# Patient Record
Sex: MALE | Race: WHITE | NOT HISPANIC OR LATINO | Employment: FULL TIME | URBAN - METROPOLITAN AREA
[De-identification: names, ages, dates, MRNs, and addresses within clinical notes are randomized per-mention and may not be internally consistent; named-entity substitution may affect disease eponyms.]

---

## 2019-06-09 ENCOUNTER — APPOINTMENT (OUTPATIENT)
Dept: GENERAL RADIOLOGY | Facility: CLINIC | Age: 34
End: 2019-06-09
Attending: PHYSICIAN ASSISTANT
Payer: COMMERCIAL

## 2019-06-09 ENCOUNTER — HOSPITAL ENCOUNTER (EMERGENCY)
Facility: CLINIC | Age: 34
Discharge: HOME | End: 2019-06-09
Attending: PHYSICIAN ASSISTANT | Admitting: PHYSICIAN ASSISTANT
Payer: COMMERCIAL

## 2019-06-09 VITALS
DIASTOLIC BLOOD PRESSURE: 94 MMHG | HEART RATE: 64 BPM | SYSTOLIC BLOOD PRESSURE: 137 MMHG | RESPIRATION RATE: 20 BRPM | TEMPERATURE: 98.3 F | OXYGEN SATURATION: 99 % | BODY MASS INDEX: 27.05 KG/M2 | WEIGHT: 205 LBS

## 2019-06-09 DIAGNOSIS — S52.502A CLOSED FRACTURE OF DISTAL END OF LEFT RADIUS, UNSPECIFIED FRACTURE MORPHOLOGY, INITIAL ENCOUNTER: ICD-10-CM

## 2019-06-09 PROCEDURE — 73090 X-RAY EXAM OF FOREARM: CPT | Mod: LT

## 2019-06-09 PROCEDURE — 73110 X-RAY EXAM OF WRIST: CPT | Mod: LT

## 2019-06-09 PROCEDURE — G0463 HOSPITAL OUTPT CLINIC VISIT: HCPCS | Performed by: PHYSICIAN ASSISTANT

## 2019-06-09 PROCEDURE — 25600 CLTX DST RDL FX/EPHYS SEP WO: CPT | Mod: LT | Performed by: PHYSICIAN ASSISTANT

## 2019-06-09 PROCEDURE — 99213 OFFICE O/P EST LOW 20 MIN: CPT | Mod: 25 | Performed by: PHYSICIAN ASSISTANT

## 2019-06-09 ASSESSMENT — ENCOUNTER SYMPTOMS: CONSTITUTIONAL NEGATIVE: 1

## 2019-06-09 NOTE — ED AVS SNAPSHOT
[facility] Emergency Department  [address] [address]  Phone:  [phone]  Fax:  [phone]                                    Maged Hugo   MRN: [id number]    Department:  [facility] Emergency Department   Date of Visit:  6/9/2019           After Visit Summary Signature Page    I have received my discharge instructions, and my questions have been answered. I have discussed any challenges I see with this plan with the nurse or doctor.    ..........................................................................................................................................  Patient/Patient Representative Signature      ..........................................................................................................................................  Patient Representative Print Name and Relationship to Patient    ..................................................               ................................................  Date                                   Time    ..........................................................................................................................................  Reviewed by Signature/Title    ...................................................              ..............................................  Date                                               Time          22EPIC Rev 08/18

## 2019-06-09 NOTE — ED PROVIDER NOTES
History     Chief Complaint   Patient presents with     Wrist Pain     left wrist injury last night     HPI  Maged Hugo is a 33 year old male who presents with complaints of left wrist pain and swelling since he fell last night.  Patient states he was playing baseball when he slid to catch a ball and landed on his right wrist.  He developed immediate pain and swelling across his wrist at that time.  Patient states he has history of ORIF of a past fracture in this left wrist about 15 years ago and still has hardware in this wrist.  He describes increased pain with movement of this wrist.  Patient is right-hand dominant.      Allergies:  No Known Allergies    Problem List:    There are no active problems to display for this patient.       Past Medical History:    No past medical history on file.    Past Surgical History:    No past surgical history on file.    Family History:    No family history on file.    Social History:  Marital Status:  Single [1]  Social History     Tobacco Use     Smoking status: Never Smoker     Smokeless tobacco: Never Used   Substance Use Topics     Alcohol use: Not on file     Drug use: Not on file        Medications:      NO ACTIVE MEDICATIONS         Review of Systems   Constitutional: Negative.    Musculoskeletal:        Left wrist pain and swelling   Skin: Negative.    All other systems reviewed and are negative.      Physical Exam   BP: (!) 137/94  Pulse: 64  Temp: 98.3  F (36.8  C)  Resp: 20  Weight: 93 kg (205 lb)  SpO2: 99 %      Physical Exam   Constitutional: He appears well-developed and well-nourished. No distress.   HENT:   Head: Normocephalic and atraumatic.   Eyes: Conjunctivae are normal.   Neck: Neck supple.   Cardiovascular: Intact distal pulses.   Pulmonary/Chest: Effort normal.   Musculoskeletal:        Left elbow: Normal.        Left wrist: He exhibits decreased range of motion, bony tenderness and swelling. He exhibits no crepitus.        Left hand: He exhibits  tenderness and swelling. He exhibits normal range of motion, no bony tenderness and normal capillary refill. Normal sensation noted. Normal strength noted.   Swelling and tenderness across left wrist   Neurological: He is alert. He has normal strength. No sensory deficit.   Skin: Skin is warm and dry.       ED Course        Splint application  Date/Time: 6/9/2019 3:31 PM  Performed by: Shantelle Treadwell PA-C  Authorized by: Shantelle Treadwell PA-C   Consent: Verbal consent obtained.  Risks and benefits: risks, benefits and alternatives were discussed  Consent given by: patient  Patient understanding: patient states understanding of the procedure being performed  Patient identity confirmed: verbally with patient  Location details: left wrist  Splint type: sugar tong  Supplies used: Ortho-Glass  Post-procedure: The splinted body part was neurovascularly unchanged following the procedure.  Patient tolerance: Patient tolerated the procedure well with no immediate complications          Results for orders placed or performed during the hospital encounter of 06/09/19 (from the past 24 hour(s))   Radius/Ulna XR,  PA &LAT, left    Narrative    LEFT FOREARM TWO VIEWS      6/9/2019 3:00 PM     HISTORY: Fall onto wrist, tenderness and swelling across wrist,  history of ORIF.    COMPARISON: None.      Impression    IMPRESSION: Distal radius fracture is better demonstrated on wrist  x-rays. No other fractures are identified. Soft tissues are  unremarkable.     CHELLY HUNT MD   XR Wrist Left G/E 3 Views    Narrative    LEFT WRIST THREE OR MORE VIEWS    6/9/2019 3:00 PM     HISTORY: Fall onto wrist, tenderness and swelling across wrist,  history of ORIF.    COMPARISON: None.    FINDINGS: An acute intra-articular distal radius fracture is present  involving the radial styloid process. Minimal displacement is present.  A screw is present within the scaphoid bone across old fracture. Mild  radiocarpal degenerative change is  present.      Impression    IMPRESSION: Acute minimally displaced fracture of the distal radius.     CHELLY HUNT MD       Medications - No data to display    Assessments & Plan (with Medical Decision Making)     Pt is a 33 year old male who presents with complaints of left wrist pain and swelling since he fell last night.  Patient states he was playing baseball when he slid to catch a ball and landed on his right wrist.  He developed immediate pain and swelling across his wrist at that time.  Patient states he has history of ORIF of a past fracture in this left wrist about 15 years ago and still has hardware in this wrist.  He describes increased pain with movement of this wrist.  Patient is right-hand dominant.  Pt is afebrile on arrival.  Exam as above.  X-rays of left wrist were positive for an acute minimally displaced distal radius fracture.  Discussed results with patient.  Pt's wrist was splinted (see above procedure note).  Return precautions were reviewed.  Hand-outs were provided.    Patient was instructed to follow-up with orthopedics in 3-5 days for continued care and management (referral was made) or sooner if new or worsening symptoms.  He is to return to the ED for persistent and/or worsening symptoms.  Patient expressed understanding of the diagnosis and plan and was discharged home in good condition.    I have reviewed the nursing notes.    I have reviewed the findings, diagnosis, plan and need for follow up with the patient.       Medication List      There are no discharge medications for this visit.         Final diagnoses:   Closed fracture of distal end of left radius, unspecified fracture morphology, initial encounter       6/9/2019   [facility] EMERGENCY DEPARTMENT      Disclaimer:  This note consists of symbols derived from keyboarding, dictation and/or voice recognition software.  As a result, there may be errors in the script that have gone undetected.  Please consider this when  interpreting information found in this chart.     Shantelle Treadwell PA-C  06/09/19 [id number]

## 2019-06-10 ENCOUNTER — OFFICE VISIT (OUTPATIENT)
Dept: ORTHOPEDICS | Facility: CLINIC | Age: 34
End: 2019-06-10
Payer: COMMERCIAL

## 2019-06-10 VITALS
WEIGHT: 231.8 LBS | HEIGHT: 73 IN | DIASTOLIC BLOOD PRESSURE: 74 MMHG | SYSTOLIC BLOOD PRESSURE: 126 MMHG | RESPIRATION RATE: 16 BRPM | BODY MASS INDEX: 30.72 KG/M2 | HEART RATE: 64 BPM

## 2019-06-10 DIAGNOSIS — S52.514A NONDISPLACED FRACTURE OF RIGHT RADIAL STYLOID PROCESS, INITIAL ENCOUNTER FOR CLOSED FRACTURE: Primary | ICD-10-CM

## 2019-06-10 PROCEDURE — 99203 OFFICE O/P NEW LOW 30 MIN: CPT | Mod: 57 | Performed by: ORTHOPAEDIC SURGERY

## 2019-06-10 PROCEDURE — 25600 CLTX DST RDL FX/EPHYS SEP WO: CPT | Mod: 55 | Performed by: ORTHOPAEDIC SURGERY

## 2019-06-10 RX ORDER — ACETAMINOPHEN 500 MG
500-1000 TABLET ORAL EVERY 6 HOURS PRN
COMMUNITY
End: 2020-08-24

## 2019-06-10 ASSESSMENT — PAIN SCALES - GENERAL: PAINLEVEL: MILD PAIN (2)

## 2019-06-10 ASSESSMENT — MIFFLIN-ST. JEOR: SCORE: 2040.32

## 2019-06-10 NOTE — PROGRESS NOTES
Patient was fitted with a left thumb spica brace. All questions were answered to patient's satisfaction. DME form explained, signed, and copy given to the patient for their records.   Keara Ames Clinical Medical Assistant

## 2019-06-10 NOTE — PROGRESS NOTES
Chief Complaint:   Chief Complaint   Patient presents with     Left Wrist - RECHECK     Distal radius fracture. DOI 6/9/19, 1 day s/p. Patient states he was playing softball and he caught a ball and rolled over his arm. Immediate pain and swelling. He was seen at [place] and placed in a splint, has stayed in splint.      Wrist Pain     Hx of surgery on the left wrist, hardware was placed ~ 2004. He can feel pain in his hand and up into his forearm.         HPI: Maged Hugo is a 33 year old male , right -hand dominant, who presents for evaluation and management of a left wrist injury. He injured his hand on 6/8/2019 while playing softball, slid to catch a ball and landed on his left wrist. Onset of pain, swelling. Continued to finish the game and then play even another game afterwards. Hand started to feel tight from the swelling. Presented to the emergency room yesterday, the next day after injury with xrays showing fracture. Splinted. It has been 2 days since the initial injury. Pain radiates up the forearm to the elbow.    Had previous open-reduction, internal fixation of left scaphoid fracture with screw ~2004. Does have aching pain in wrist with weather changes. Also in his ankle from prior injury/surgery as well.      He reports having mild pain/discomfort around the injury site. He denies associated numbness or tingling. He denies any other injuries to his upper extremity.   Symptoms: pain, swelling.  Location of symptoms: left wrist.  Pain severity: 2/10  Pain quality: dull and aching  Frequency of symptoms: are constant  Aggravating factors: movement.  Relieving factors: rest, splint.    Previous treatment: splint.    Past medical history:    There are no active problems to display for this patient.    Past surgical history:  has no past surgical history on file.     Medications:    Current Outpatient Medications   Medication Sig Dispense Refill     NO ACTIVE MEDICATIONS           Allergies:   No Known  "Allergies     Family History: family history is not on file.     Social History: ;  reports that he has never smoked. He has never used smokeless tobacco.    Review of Systems:  ROS: 10 point ROS neg other than the symptoms noted above in the HPI and past medical history.    Physical Exam  GENERAL APPEARANCE: healthy, alert, no distress. Accompanied by his wife, 2 small children.  SKIN: no suspicious lesions or rashes  NEURO: Normal strength and tone, mentation intact and speech normal  PSYCH:  mentation appears normal and affect normal. Not anxious.  RESPIRATORY: No increased work of breathing.    /74   Pulse 64   Resp 16   Ht 1.854 m (6' 1\")   Wt 105.1 kg (231 lb 12.8 oz)   BMI 30.58 kg/m       left WRIST/HAND EXAM:    The splint was removed    Skin intact. No abnormal skin discoloration, erythema or ecchymosis.   Normal wear pattern, color and tone.    Curvilinear scar dorsoradial wrist from prior scaphoid open-reduction, internal fixation.    There is moderate swelling in the wrist, hand, fingers of the right upper extremity .  There is moderate tenderness in the wrist, notably the radial styloid  Nontender to palpation ulnarly, forearm, hand.  There is no ecchymosis.  There is no erythema of the surrounding skin.  There is no maceration of the skin.  There is swelling deformity in the area.    Intact sensation light touch median, radial, ulnar nerves of the hand  Intact sensation to the radial and ulnar digital nerves of the fingers, as well as the finger tips.  Intact epl fpl fdp edc wrist flexion/extension biceps/triceps deltoid  Brisk capillary refill to all fingers.   Palpable radial pulse, 2+.    X-rays:  3 views left wrist, 2 views left forearm from 6/9/2019 were reviewed in clinic today. On my review, intra-articular distal radius fracture is present involving the radial styloid process. Minimal displacement is present. A screw is present within the scaphoid bone across old " fracture. Mild radiocarpal degenerative change is present.      Assessment: 32yo RHD male with minimally displaced, intra-articular radial styloid fracture right distal radius.    Plan:  * reviewed xrays with patient. Based on the xrays, the fracture is minimally/mildly displaced and in acceptable alignment. As long as fracture maintains alignment, nonoperative treatment can be pursued. However, if fracture displaces, then the fracture is unstable, and would recommend open-reduction and internal fixation. I did discuss that we would need to monitor this fracture on a weekly basis for the next couple of weeks, such that if fracture does displace, we can catch it sooner rather than later, and proceed with treatment at that time. Patient does understand.     * immobilize in thumb spica brace today, on essentially 24/7.  * elevation, finger range of motion  * over the counter pain control  * non weight bearing. Finger range of motion  * reassess 2 weeks, repeat xrays right wrist.      James Kennedy M.D., M.S.  Dept. of Orthopaedic Surgery  [facility]

## 2019-06-10 NOTE — LETTER
6/10/2019         RE: Maged Hugo  [id number] [facility] [address]        Dear Colleague,    Thank you for referring your patient, Maged Hugo, to the [facility] [place]. Please see a copy of my visit note below.    Chief Complaint:   Chief Complaint   Patient presents with     Left Wrist - RECHECK     Distal radius fracture. DOI 6/9/19, 1 day s/p. Patient states he was playing softball and he caught a ball and rolled over his arm. Immediate pain and swelling. He was seen at [place] and placed in a splint, has stayed in splint.      Wrist Pain     Hx of surgery on the left wrist, hardware was placed ~ 2004. He can feel pain in his hand and up into his forearm.         HPI: Maged Hugo is a 33 year old male , right -hand dominant, who presents for evaluation and management of a left wrist injury. He injured his hand on 6/8/2019 while playing softball, slid to catch a ball and landed on his left wrist. Onset of pain, swelling. Continued to finish the game and then play even another game afterwards. Hand started to feel tight from the swelling. Presented to the emergency room yesterday, the next day after injury with xrays showing fracture. Splinted. It has been 2 days since the initial injury. Pain radiates up the forearm to the elbow.    Had previous open-reduction, internal fixation of left scaphoid fracture with screw ~2004. Does have aching pain in wrist with weather changes. Also in his ankle from prior injury/surgery as well.      He reports having mild pain/discomfort around the injury site. He denies associated numbness or tingling. He denies any other injuries to his upper extremity.   Symptoms: pain, swelling.  Location of symptoms: left wrist.  Pain severity: 2/10  Pain quality: dull and aching  Frequency of symptoms: are constant  Aggravating factors: movement.  Relieving factors: rest, splint.    Previous treatment: splint.    Past medical history:    There are  "no active problems to display for this patient.    Past surgical history:  has no past surgical history on file.     Medications:    Current Outpatient Medications   Medication Sig Dispense Refill     NO ACTIVE MEDICATIONS           Allergies:   No Known Allergies     Family History: family history is not on file.     Social History: ;  reports that he has never smoked. He has never used smokeless tobacco.    Review of Systems:  ROS: 10 point ROS neg other than the symptoms noted above in the HPI and past medical history.    Physical Exam  GENERAL APPEARANCE: healthy, alert, no distress. Accompanied by his wife, 2 small children.  SKIN: no suspicious lesions or rashes  NEURO: Normal strength and tone, mentation intact and speech normal  PSYCH:  mentation appears normal and affect normal. Not anxious.  RESPIRATORY: No increased work of breathing.    /74   Pulse 64   Resp 16   Ht 1.854 m (6' 1\")   Wt 105.1 kg (231 lb 12.8 oz)   BMI 30.58 kg/m        left WRIST/HAND EXAM:    The splint was removed    Skin intact. No abnormal skin discoloration, erythema or ecchymosis.   Normal wear pattern, color and tone.    Curvilinear scar dorsoradial wrist from prior scaphoid open-reduction, internal fixation.    There is moderate swelling in the wrist, hand, fingers of the right upper extremity .  There is moderate tenderness in the wrist, notably the radial styloid  Nontender to palpation ulnarly, forearm, hand.  There is no ecchymosis.  There is no erythema of the surrounding skin.  There is no maceration of the skin.  There is swelling deformity in the area.    Intact sensation light touch median, radial, ulnar nerves of the hand  Intact sensation to the radial and ulnar digital nerves of the fingers, as well as the finger tips.  Intact epl fpl fdp edc wrist flexion/extension biceps/triceps deltoid  Brisk capillary refill to all fingers.   Palpable radial pulse, 2+.    X-rays:  3 views left wrist, " 2 views left forearm from 6/9/2019 were reviewed in clinic today. On my review, intra-articular distal radius fracture is present involving the radial styloid process. Minimal displacement is present. A screw is present within the scaphoid bone across old fracture. Mild radiocarpal degenerative change is present.      Assessment: 32yo RHD male with minimally displaced, intra-articular radial styloid fracture right distal radius.    Plan:  * reviewed xrays with patient. Based on the xrays, the fracture is minimally/mildly displaced and in acceptable alignment. As long as fracture maintains alignment, nonoperative treatment can be pursued. However, if fracture displaces, then the fracture is unstable, and would recommend open-reduction and internal fixation. I did discuss that we would need to monitor this fracture on a weekly basis for the next couple of weeks, such that if fracture does displace, we can catch it sooner rather than later, and proceed with treatment at that time. Patient does understand.     * immobilize in thumb spica brace today, on essentially 24/7.  * elevation, finger range of motion  * over the counter pain control  * non weight bearing. Finger range of motion  * reassess 2 weeks, repeat xrays right wrist.      James Kennedy M.D., M.S.  Dept. of Orthopaedic Surgery  [facility]      Patient was fitted with a left thumb spica brace. All questions were answered to patient's satisfaction. DME form explained, signed, and copy given to the patient for their records.   Keara Ames Clinical Medical Assistant        Again, thank you for allowing me to participate in the care of your patient.        Sincerely,        James Kennedy MD

## 2019-06-17 ENCOUNTER — TELEPHONE (OUTPATIENT)
Dept: ORTHOPEDICS | Facility: CLINIC | Age: 34
End: 2019-06-17

## 2019-06-17 NOTE — TELEPHONE ENCOUNTER
Patient would like a call back, as they believe they should be scheduling a follow up x-ray, but there are no orders.  Should they schedule a follow up with Dr. Kennedy or should x-ray orders be placed and they schedule for another x-ray?

## 2019-06-17 NOTE — TELEPHONE ENCOUNTER
Called patient and let him know he should make an appointment with Dr. Kennedy and they will get the xray at that time.    Rachana Cast MA

## 2019-06-24 ENCOUNTER — ANCILLARY PROCEDURE (OUTPATIENT)
Dept: GENERAL RADIOLOGY | Facility: CLINIC | Age: 34
End: 2019-06-24
Attending: ORTHOPAEDIC SURGERY
Payer: COMMERCIAL

## 2019-06-24 ENCOUNTER — OFFICE VISIT (OUTPATIENT)
Dept: ORTHOPEDICS | Facility: CLINIC | Age: 34
End: 2019-06-24
Payer: COMMERCIAL

## 2019-06-24 VITALS
HEART RATE: 89 BPM | HEIGHT: 73 IN | DIASTOLIC BLOOD PRESSURE: 72 MMHG | SYSTOLIC BLOOD PRESSURE: 131 MMHG | WEIGHT: 229.2 LBS | RESPIRATION RATE: 16 BRPM | BODY MASS INDEX: 30.38 KG/M2

## 2019-06-24 DIAGNOSIS — S52.514A NONDISPLACED FRACTURE OF RIGHT RADIAL STYLOID PROCESS, INITIAL ENCOUNTER FOR CLOSED FRACTURE: ICD-10-CM

## 2019-06-24 DIAGNOSIS — S52.515D NONDISPLACED FRACTURE OF LEFT RADIAL STYLOID PROCESS, SUBSEQUENT ENCOUNTER FOR CLOSED FRACTURE WITH ROUTINE HEALING: Primary | ICD-10-CM

## 2019-06-24 PROCEDURE — 99207 ZZC FRACTURE CARE IN GLOBAL PERIOD: CPT | Performed by: ORTHOPAEDIC SURGERY

## 2019-06-24 PROCEDURE — 73110 X-RAY EXAM OF WRIST: CPT | Mod: LT

## 2019-06-24 ASSESSMENT — MIFFLIN-ST. JEOR: SCORE: 2028.52

## 2019-06-24 ASSESSMENT — PAIN SCALES - GENERAL: PAINLEVEL: NO PAIN (0)

## 2019-06-24 NOTE — LETTER
6/24/2019         RE: Maged Hugo  [id number] [facility] [address]        Dear Colleague,    Thank you for referring your patient, Maged Hugo, to the [facility] [place]. Please see a copy of my visit note below.    Chief Complaint:   Chief Complaint   Patient presents with     Left Wrist - RECHECK     Distal radius fracture. DOI 6/9/19, 2 wk s/p. Patient notes his wrist is doing good. He continues to wear the brace. Denies any pain. Able to move his fingers without issue. Has a little stiffness in hand.       Nondisplaced left wrist radial styloid fracture  6/8/2019      HPI: Maged Hugo is a 33 year old male , right -hand dominant, who presents for followup evaluation and management of a left wrist injury. He injured his hand on 6/8/2019 while playing softball, slid to catch a ball and landed on his left wrist. Onset of pain, swelling. Continued to finish the game and then play even another game afterwards. Hand started to feel tight from the swelling. Presented to the emergency room  the next day after injury with xrays showing fracture. Splinted. It has been 2 weeks since the initial injury. Has been wearing thumb spica brace. Denies pain, hand/finger stiffness from brace. He's been taking it easy. Pain 0/10.    Had previous open-reduction, internal fixation of left scaphoid fracture with screw ~2004. Does have aching pain in wrist with weather changes. Also in his ankle from prior injury/surgery as well.      He reports having no pain/discomfort around the injury site. He denies associated numbness or tingling. He denies any other injuries to his upper extremity.   Symptoms: stiffness.  Location of symptoms: left wrist/hand.  Pain severity: 0/10  Pain quality: dull and stiff  Frequency of symptoms: are occasional  Aggravating factors: movement.  Relieving factors: rest, splint.    Previous treatment: splint.    Past medical history:    There are no active problems to  "display for this patient.    Past surgical history:  has no past surgical history on file.     Medications:    Current Outpatient Medications   Medication Sig Dispense Refill     acetaminophen (TYLENOL) 500 MG tablet Take 500-1,000 mg by mouth every 6 hours as needed for mild pain       NO ACTIVE MEDICATIONS           Allergies:   No Known Allergies     Family History: family history is not on file.     Social History: ;  reports that he has never smoked. He has never used smokeless tobacco.    Review of Systems:     Denies numbness, tingling, parasthesias.   Denies headaches.   Denies fevers, chills, night sweats   Denies chest pain.   Denies shortness of breath.   Denies any skin problems, abrasions, rashes, irritation.    Physical Exam  GENERAL APPEARANCE: healthy, alert, no distress  SKIN: no suspicious lesions or rashes  NEURO: Normal strength and tone, mentation intact and speech normal  PSYCH:  mentation appears normal and affect normal. Not anxious.  RESPIRATORY: No increased work of breathing.    /72   Pulse 89   Resp 16   Ht 1.854 m (6' 1\")   Wt 104 kg (229 lb 3.2 oz)   BMI 30.24 kg/m        left WRIST/HAND EXAM:    The brace was removed    Skin intact. No abnormal skin discoloration, erythema or ecchymosis.   Normal wear pattern, color and tone.    Curvilinear scar dorsoradial wrist from prior scaphoid open-reduction, internal fixation.    There is no swelling in the wrist, hand, fingers of the right upper extremity .  There is mild tenderness in the wrist, notably the radial styloid  Nontender to palpation ulnarly, forearm, hand.  There is no ecchymosis.  There is no erythema of the surrounding skin.  There is no maceration of the skin.  There is swelling deformity in the area.    Intact sensation light touch median, radial, ulnar nerves of the hand  Intact sensation to the radial and ulnar digital nerves of the fingers, as well as the finger tips.  Intact epl fpl fdp edc " wrist flexion/extension biceps/triceps deltoid  Brisk capillary refill to all fingers.   Palpable radial pulse, 2+.    X-rays:  3 views left wrist 6/24/2019 were reviewed in clinic today. On my review, intra-articular distal radius fracture is present involving the radial styloid process. Minimal displacement is present. A screw is present within the scaphoid bone across old fracture. Mild radiocarpal degenerative change is present. Cystic changes distal radius as prior. No change in alignment from prior.      Assessment: 34yo RHD male with minimally displaced, intra-articular radial styloid fracture left distal radius.    Plan:  * reviewed xrays with patient. Based on the xrays, the fracture is minimally if at all displaced and in acceptable alignment. Alignment is stable to previous images 2 weeks ago. As long as fracture maintains alignment, nonoperative treatment can be continued.    * immobilize in thumb spica brace, on essentially 24/7, except hygiene.  * elevation, finger range of motion  * over the counter pain control  * non weight bearing. Finger range of motion  * reassess 4 weeks, repeat xrays left  wrist.      James Kennedy M.D., M.S.  Dept. of Orthopaedic Surgery  [facility]      Again, thank you for allowing me to participate in the care of your patient.        Sincerely,        James Kennedy MD

## 2019-07-22 ENCOUNTER — OFFICE VISIT (OUTPATIENT)
Dept: ORTHOPEDICS | Facility: CLINIC | Age: 34
End: 2019-07-22
Payer: COMMERCIAL

## 2019-07-22 ENCOUNTER — ANCILLARY PROCEDURE (OUTPATIENT)
Dept: GENERAL RADIOLOGY | Facility: CLINIC | Age: 34
End: 2019-07-22
Attending: ORTHOPAEDIC SURGERY
Payer: COMMERCIAL

## 2019-07-22 VITALS
HEIGHT: 73 IN | WEIGHT: 225 LBS | BODY MASS INDEX: 29.82 KG/M2 | HEART RATE: 106 BPM | DIASTOLIC BLOOD PRESSURE: 89 MMHG | SYSTOLIC BLOOD PRESSURE: 135 MMHG

## 2019-07-22 DIAGNOSIS — S52.515D NONDISPLACED FRACTURE OF LEFT RADIAL STYLOID PROCESS, SUBSEQUENT ENCOUNTER FOR CLOSED FRACTURE WITH ROUTINE HEALING: Primary | ICD-10-CM

## 2019-07-22 DIAGNOSIS — S52.515D NONDISPLACED FRACTURE OF LEFT RADIAL STYLOID PROCESS, SUBSEQUENT ENCOUNTER FOR CLOSED FRACTURE WITH ROUTINE HEALING: ICD-10-CM

## 2019-07-22 PROCEDURE — 73110 X-RAY EXAM OF WRIST: CPT | Mod: LT

## 2019-07-22 PROCEDURE — 99213 OFFICE O/P EST LOW 20 MIN: CPT | Performed by: ORTHOPAEDIC SURGERY

## 2019-07-22 ASSESSMENT — PAIN SCALES - GENERAL: PAINLEVEL: NO PAIN (0)

## 2019-07-22 ASSESSMENT — MIFFLIN-ST. JEOR: SCORE: 2009.47

## 2019-07-22 NOTE — PROGRESS NOTES
Chief Complaint:   Chief Complaint   Patient presents with     Left Wrist - Follow Up     Distal radius fracture. DOI 6/9/19, 6 wk s/p. Patient notes his wrist is feeling pretty good. Denies any pain or problems. He continues to wear the brace.       Nondisplaced left wrist radial styloid fracture  6/8/2019      HPI: Maged Hugo is a 33 year old male , right -hand dominant, who presents for followup evaluation and management of a left wrist injury. He injured his hand on 6/8/2019 while playing softball, slid to catch a ball and landed on his left wrist. Onset of pain, swelling. Continued to finish the game and then play even another game afterwards. Hand started to feel tight from the swelling. Presented to the emergency room  the next day after injury with xrays showing fracture. Splinted. It has been 6 weeks since the initial injury. Has been wearing thumb spica brace with activities, otherwise off. Denies pain, hand/finger stiffness from brace. He's been taking it easy. Pain 0/10.    Had previous open-reduction, internal fixation of left scaphoid fracture with screw ~2004. Does have aching pain in wrist with weather changes. Also in his ankle from prior injury/surgery as well.      He reports having no pain/discomfort around the injury site. He denies associated numbness or tingling. He denies any other injuries to his upper extremity.   Symptoms: stiffness.  Location of symptoms: left wrist/hand.  Pain severity: 0/10  Pain quality: dull and stiff  Frequency of symptoms: are occasional  Aggravating factors: movement.  Relieving factors: rest, splint.    Previous treatment: splint.    Past medical history:    There are no active problems to display for this patient.    Past surgical history:  has no past surgical history on file.     Medications:    Current Outpatient Medications   Medication Sig Dispense Refill     NO ACTIVE MEDICATIONS        acetaminophen (TYLENOL) 500 MG tablet Take 500-1,000 mg by mouth  "every 6 hours as needed for mild pain          Allergies:   No Known Allergies     Family History: family history is not on file.     Social History: ;  reports that he has never smoked. He has never used smokeless tobacco.    Review of Systems:     Denies numbness, tingling, parasthesias.   Denies headaches.   Denies fevers, chills, night sweats   Denies chest pain.   Denies shortness of breath.   Denies any skin problems, abrasions, rashes, irritation.    Physical Exam  GENERAL APPEARANCE: healthy, alert, no distress  SKIN: no suspicious lesions or rashes  NEURO: Normal strength and tone, mentation intact and speech normal  PSYCH:  mentation appears normal and affect normal. Not anxious.  RESPIRATORY: No increased work of breathing.    /89   Pulse 106   Ht 1.854 m (6' 1\")   Wt 102.1 kg (225 lb)   BMI 29.69 kg/m       left WRIST/HAND EXAM:    The brace was removed    Skin intact. No abnormal skin discoloration, erythema or ecchymosis.  Noted suntan line from wearing brace.  Normal wear pattern, color and tone.    Curvilinear scar dorsoradial wrist from prior scaphoid open-reduction, internal fixation.    There is no swelling in the wrist, hand, fingers of the right upper extremity .  There is no tenderness in the wrist, notably the radial styloid  Nontender to palpation ulnarly, forearm, hand.  There is no ecchymosis.  There is no erythema of the surrounding skin.  There is no maceration of the skin.  There is swelling deformity in the area.    Intact sensation light touch median, radial, ulnar nerves of the hand  Intact sensation to the radial and ulnar digital nerves of the fingers, as well as the finger tips.  Intact epl fpl fdp edc wrist flexion/extension biceps/triceps deltoid  Brisk capillary refill to all fingers.   Palpable radial pulse, 2+.    X-rays:  3 views left wrist 7/22/2019 were reviewed in clinic today. On my review, intra-articular distal radius fracture is present " involving the radial styloid process. Minimal displacement is present. A screw is present within the scaphoid bone across old fracture. Mild radiocarpal degenerative change is present. Cystic changes distal radius as prior. No change in alignment from prior. Decreased fracture lucency, increased sclerosis. Noted.      Assessment: 32yo RHD male with minimally displaced, intra-articular radial styloid fracture left distal radius.    Plan:  * reviewed xrays with patient. Fracture alignment stable. Interval healing, not complete.    * immobilize in thumb spica brace, on with activities, off at rest.  * elevation, finger range of motion, wrist range of motion.  * over the counter pain control  * non weight bearing. Finger range of motion  * reassess 4 weeks, repeat xrays left  wrist.      James Kennedy M.D., M.S.  Dept. of Orthopaedic Surgery  [facility]

## 2019-07-22 NOTE — LETTER
7/22/2019         RE: Maged Hugo  [id number] [facility] [address]        Dear Colleague,    Thank you for referring your patient, Maged Hugo, to the [facility] [place]. Please see a copy of my visit note below.    Chief Complaint:   Chief Complaint   Patient presents with     Left Wrist - Follow Up     Distal radius fracture. DOI 6/9/19, 6 wk s/p. Patient notes his wrist is feeling pretty good. Denies any pain or problems. He continues to wear the brace.       Nondisplaced left wrist radial styloid fracture  6/8/2019      HPI: Maged Hugo is a 33 year old male , right -hand dominant, who presents for followup evaluation and management of a left wrist injury. He injured his hand on 6/8/2019 while playing softball, slid to catch a ball and landed on his left wrist. Onset of pain, swelling. Continued to finish the game and then play even another game afterwards. Hand started to feel tight from the swelling. Presented to the emergency room  the next day after injury with xrays showing fracture. Splinted. It has been 6 weeks since the initial injury. Has been wearing thumb spica brace with activities, otherwise off. Denies pain, hand/finger stiffness from brace. He's been taking it easy. Pain 0/10.    Had previous open-reduction, internal fixation of left scaphoid fracture with screw ~2004. Does have aching pain in wrist with weather changes. Also in his ankle from prior injury/surgery as well.      He reports having no pain/discomfort around the injury site. He denies associated numbness or tingling. He denies any other injuries to his upper extremity.   Symptoms: stiffness.  Location of symptoms: left wrist/hand.  Pain severity: 0/10  Pain quality: dull and stiff  Frequency of symptoms: are occasional  Aggravating factors: movement.  Relieving factors: rest, splint.    Previous treatment: splint.    Past medical history:    There are no active problems to display for this  "patient.    Past surgical history:  has no past surgical history on file.     Medications:    Current Outpatient Medications   Medication Sig Dispense Refill     NO ACTIVE MEDICATIONS        acetaminophen (TYLENOL) 500 MG tablet Take 500-1,000 mg by mouth every 6 hours as needed for mild pain          Allergies:   No Known Allergies     Family History: family history is not on file.     Social History: ;  reports that he has never smoked. He has never used smokeless tobacco.    Review of Systems:     Denies numbness, tingling, parasthesias.   Denies headaches.   Denies fevers, chills, night sweats   Denies chest pain.   Denies shortness of breath.   Denies any skin problems, abrasions, rashes, irritation.    Physical Exam  GENERAL APPEARANCE: healthy, alert, no distress  SKIN: no suspicious lesions or rashes  NEURO: Normal strength and tone, mentation intact and speech normal  PSYCH:  mentation appears normal and affect normal. Not anxious.  RESPIRATORY: No increased work of breathing.    /89   Pulse 106   Ht 1.854 m (6' 1\")   Wt 102.1 kg (225 lb)   BMI 29.69 kg/m        left WRIST/HAND EXAM:    The brace was removed    Skin intact. No abnormal skin discoloration, erythema or ecchymosis.  Noted suntan line from wearing brace.  Normal wear pattern, color and tone.    Curvilinear scar dorsoradial wrist from prior scaphoid open-reduction, internal fixation.    There is no swelling in the wrist, hand, fingers of the right upper extremity .  There is no tenderness in the wrist, notably the radial styloid  Nontender to palpation ulnarly, forearm, hand.  There is no ecchymosis.  There is no erythema of the surrounding skin.  There is no maceration of the skin.  There is swelling deformity in the area.    Intact sensation light touch median, radial, ulnar nerves of the hand  Intact sensation to the radial and ulnar digital nerves of the fingers, as well as the finger tips.  Intact epl fpl fdp " edc wrist flexion/extension biceps/triceps deltoid  Brisk capillary refill to all fingers.   Palpable radial pulse, 2+.    X-rays:  3 views left wrist 7/22/2019 were reviewed in clinic today. On my review, intra-articular distal radius fracture is present involving the radial styloid process. Minimal displacement is present. A screw is present within the scaphoid bone across old fracture. Mild radiocarpal degenerative change is present. Cystic changes distal radius as prior. No change in alignment from prior. Decreased fracture lucency, increased sclerosis. Noted.      Assessment: 32yo RHD male with minimally displaced, intra-articular radial styloid fracture left distal radius.    Plan:  * reviewed xrays with patient. Fracture alignment stable. Interval healing, not complete.    * immobilize in thumb spica brace, on with activities, off at rest.  * elevation, finger range of motion, wrist range of motion.  * over the counter pain control  * non weight bearing. Finger range of motion  * reassess 4 weeks, repeat xrays left  wrist.      James Kennedy M.D., M.S.  Dept. of Orthopaedic Surgery  [facility]      Again, thank you for allowing me to participate in the care of your patient.        Sincerely,        James Kennedy MD

## 2019-12-09 ENCOUNTER — OFFICE VISIT (OUTPATIENT)
Dept: UROLOGY | Facility: CLINIC | Age: 34
End: 2019-12-09
Payer: COMMERCIAL

## 2019-12-09 VITALS
HEART RATE: 66 BPM | RESPIRATION RATE: 16 BRPM | TEMPERATURE: 97.9 F | DIASTOLIC BLOOD PRESSURE: 83 MMHG | SYSTOLIC BLOOD PRESSURE: 149 MMHG

## 2019-12-09 DIAGNOSIS — Z30.09 VASECTOMY EVALUATION: Primary | ICD-10-CM

## 2019-12-09 PROCEDURE — 99202 OFFICE O/P NEW SF 15 MIN: CPT | Performed by: UROLOGY

## 2019-12-09 NOTE — NURSING NOTE
"Chief Complaint   Patient presents with     Consult     Vasectomy        Initial BP (!) 149/83 (BP Location: Left arm, Patient Position: Chair, Cuff Size: Adult Regular)   Pulse 66   Temp 97.9  F (36.6  C) (Tympanic)   Resp 16  Estimated body mass index is 29.69 kg/m  as calculated from the following:    Height as of 7/22/19: 1.854 m (6' 1\").    Weight as of 7/22/19: 102.1 kg (225 lb).  BP completed using cuff size: regular   Medications and allergies reviewed.      Floresita MASSEY CMA     "

## 2019-12-09 NOTE — PATIENT INSTRUCTIONS
Per physician instructions.    If you have questions or concerns on any instructions given to you by your provider today or if you need to schedule an appointment, you can reach us at [phone].    Thank you!

## 2019-12-18 ENCOUNTER — OFFICE VISIT (OUTPATIENT)
Dept: UROLOGY | Facility: CLINIC | Age: 34
End: 2019-12-18
Payer: COMMERCIAL

## 2019-12-18 VITALS
SYSTOLIC BLOOD PRESSURE: 126 MMHG | BODY MASS INDEX: 29.82 KG/M2 | WEIGHT: 225 LBS | HEIGHT: 73 IN | DIASTOLIC BLOOD PRESSURE: 75 MMHG | RESPIRATION RATE: 16 BRPM | HEART RATE: 57 BPM

## 2019-12-18 DIAGNOSIS — Z30.2 ENCOUNTER FOR VASECTOMY: Primary | ICD-10-CM

## 2019-12-18 PROCEDURE — 55250 REMOVAL OF SPERM DUCT(S): CPT | Performed by: UROLOGY

## 2019-12-18 RX ORDER — HYDROCODONE BITARTRATE AND ACETAMINOPHEN 5; 325 MG/1; MG/1
1 TABLET ORAL EVERY 6 HOURS PRN
Qty: 15 TABLET | Refills: 0 | Status: SHIPPED | OUTPATIENT
Start: 2019-12-18 | End: 2020-08-24

## 2019-12-18 ASSESSMENT — MIFFLIN-ST. JEOR: SCORE: 2004.47

## 2019-12-18 NOTE — PATIENT INSTRUCTIONS
Per physician instructions      If you have questions or concerns on any instructions given to you by your provider today or if you need to schedule an appointment, you can reach us at [phone].

## 2019-12-18 NOTE — PROGRESS NOTES
PREOPERATIVE DIAGNOSIS: Voluntary sterilization.     POSTOPERATIVE DIAGNOSIS: Voluntary sterilization.     PROCEDURE: Bilateral partial vasectomy.     SURGEON: Umesh Mays MD     ANESTHESIA: Local.     SUBJECTIVE:    PROCEDURE: The patient was brought to the procedure room where he was placed in the supine position. Pause for the cause was completed. He was prepped and draped in a sterile fashion. The patient's left vas deferens was isolated under the skin and local infiltration was used with 1% lidocaine (without epinephrine) anesthetic.    A small incision was made over the vas deferens which was dissected from its surrounding tissues.  Weck clips were applied proximally and distally and the vas deferens was transected.  The exposed ends of the vas deferens were then cauterized.    The exact same procedure was performed on the right side and the hemostasis was rechecked bilaterally and found to be satisfactory and the wounds were closed with a single suture of 3-0 Chromic through the skin and dartos layers. The patient tolerated the procedure well and was discharged in satisfactory condition.    The patient was instructed to return in 3 months with his first semen analysis and warned that he should presume fertility until the semen samples demonstrate an absence of sperm.

## 2019-12-18 NOTE — NURSING NOTE
"Chief Complaint   Patient presents with     Sterilization       Initial /75 (BP Location: Right arm, Patient Position: Sitting, Cuff Size: Adult Large)   Pulse 57   Resp 16   Ht 1.854 m (6' 1\")   Wt 102.1 kg (225 lb)   BMI 29.69 kg/m   Estimated body mass index is 29.69 kg/m  as calculated from the following:    Height as of this encounter: 1.854 m (6' 1\").    Weight as of this encounter: 102.1 kg (225 lb).  BP completed using cuff size: large  Medications and allergies reviewed.      Olena ARREDONDO MA    "

## 2020-08-24 ENCOUNTER — OFFICE VISIT (OUTPATIENT)
Dept: FAMILY MEDICINE | Facility: CLINIC | Age: 35
End: 2020-08-24
Payer: COMMERCIAL

## 2020-08-24 VITALS
DIASTOLIC BLOOD PRESSURE: 60 MMHG | WEIGHT: 233 LBS | BODY MASS INDEX: 30.88 KG/M2 | RESPIRATION RATE: 16 BRPM | OXYGEN SATURATION: 97 % | HEART RATE: 83 BPM | HEIGHT: 73 IN | TEMPERATURE: 99.9 F | SYSTOLIC BLOOD PRESSURE: 124 MMHG

## 2020-08-24 DIAGNOSIS — N30.00 ACUTE CYSTITIS WITHOUT HEMATURIA: Primary | ICD-10-CM

## 2020-08-24 DIAGNOSIS — R82.90 NONSPECIFIC FINDING ON EXAMINATION OF URINE: ICD-10-CM

## 2020-08-24 DIAGNOSIS — R30.0 DYSURIA: ICD-10-CM

## 2020-08-24 LAB
ALBUMIN UR-MCNC: NEGATIVE MG/DL
APPEARANCE UR: CLEAR
BACTERIA #/AREA URNS HPF: ABNORMAL /HPF
BILIRUB UR QL STRIP: NEGATIVE
COLOR UR AUTO: YELLOW
GLUCOSE UR STRIP-MCNC: NEGATIVE MG/DL
HGB UR QL STRIP: ABNORMAL
KETONES UR STRIP-MCNC: NEGATIVE MG/DL
LEUKOCYTE ESTERASE UR QL STRIP: ABNORMAL
NITRATE UR QL: NEGATIVE
PH UR STRIP: 6.5 PH (ref 5–7)
RBC #/AREA URNS AUTO: ABNORMAL /HPF
SOURCE: ABNORMAL
SP GR UR STRIP: <=1.005 (ref 1–1.03)
UROBILINOGEN UR STRIP-ACNC: 0.2 EU/DL (ref 0.2–1)
WBC #/AREA URNS AUTO: ABNORMAL /HPF

## 2020-08-24 PROCEDURE — 87086 URINE CULTURE/COLONY COUNT: CPT | Performed by: NURSE PRACTITIONER

## 2020-08-24 PROCEDURE — 81001 URINALYSIS AUTO W/SCOPE: CPT | Performed by: NURSE PRACTITIONER

## 2020-08-24 PROCEDURE — 99203 OFFICE O/P NEW LOW 30 MIN: CPT | Performed by: NURSE PRACTITIONER

## 2020-08-24 PROCEDURE — 87088 URINE BACTERIA CULTURE: CPT | Performed by: NURSE PRACTITIONER

## 2020-08-24 PROCEDURE — 87186 SC STD MICRODIL/AGAR DIL: CPT | Performed by: NURSE PRACTITIONER

## 2020-08-24 RX ORDER — SULFAMETHOXAZOLE/TRIMETHOPRIM 800-160 MG
1 TABLET ORAL 2 TIMES DAILY
Qty: 14 TABLET | Refills: 0 | Status: SHIPPED | OUTPATIENT
Start: 2020-08-24 | End: 2020-08-31

## 2020-08-24 ASSESSMENT — MIFFLIN-ST. JEOR: SCORE: 2035.76

## 2020-08-24 NOTE — PATIENT INSTRUCTIONS
Patient Education     Urinary Tract Infections in Men    Urinary tract infections (UTIs) are most often caused by bacteria that invade the urinary tract. The bacteria may come from outside the body. Or they may travel from the skin outside of the rectum into the urethra. Pain in or around the urinary tract is a common symptom for most UTIs. But the only way to know for sure if you have a UTI is to have a urinalysis and urine culture.   Types of UTIs    Cystitis. This is a bladder infection. It is often linked to a blockage from an enlarged prostate. You may have an urgent or frequent need to urinate, and bloody urine. Treatment includes antibiotics and medicine to relax or shrink the prostate. Sometimes you will need surgery.    Urethritis. This is an infection of the urethra. You may have a discharge from the urethra or burning when you urinate. You may also have pain in the urethra or penis. It is treated with antibiotics.    Prostatitis. This is an inflammation or infection of the prostate. You may have an urgent or frequent need to urinate, fever, or burning when you urinate. Or you may have a tender prostate, or a vague feeling of pressure. Prostatitis is treated with a range of medicines, depending on the cause.    Pyelonephritis. This is a kidney infection. If not treated, it can be serious and damage your kidneys. In severe cases you may need to stay in the hospital. You may have a fever and lower back pain.  Medicines to treat a UTI  Most UTIs are treated with antibiotics. These kill the bacteria. The length of time you need to take them depends on the type of infection. Take antibiotics exactly as directed until all of the medicine is gone. If you don't, the infection may not go away and may become harder to treat. For certain types of UTIs, you may be given other medicine to help treat your symptoms.  Lifestyle changes to treat and prevent UTIs  The lifestyle changes below will help get rid of your  current infection. They may also help prevent future UTIs.    Drink plenty of fluids such as water, juice, or other caffeine-free drinks. This helps flush bacteria out of your system.    Empty your bladder when you feel the urge to urinate and before going to sleep. Urine that stays in your bladder promotes infection.    Use condoms during sex. These help prevent UTIs caused by sexually transmitted bacteria.    Keep follow-up appointments with your healthcare provider. He or she can may do tests to make sure the infection has cleared. If needed, more treatment can be started.  Other treatments to prevent UTIs  Most UTIs respond to medicine. But sometimes you will need a procedure or surgery. This can treat an enlarged prostate, or remove a kidney stone or other blockage. Surgery may also treat problems caused by scarring or long-term infections.  Date Last Reviewed: 1/1/2017 2000-2017 [employer]. [address] [place] [address], [place] [address]. All rights reserved. This information is not intended as a substitute for professional medical care. Always follow your healthcare professional's instructions. This information has been modified by your health care provider with permission from the publisher.

## 2020-08-24 NOTE — PROGRESS NOTES
"Subjective     Maged Hugo is a 35 year old male who presents to clinic today for the following health issues:    HPI       Genitourinary - Male  Onset/Duration: 2 days   Description:   Dysuria (painful urination): YES  Hematuria (blood in urine): no  Frequency: YES  Waking at night to urinate: no  Hesitancy (delay in urine): no  Retention (unable to empty): no  Decrease in urinary flow: YES  Incontinence: no  Progression of Symptoms:  improving  Accompanying Signs & Symptoms:  Fever: YES- low grade, temp in clinic today is 99.9  Back/Flank pain: no  Urethral discharge: no  Testicle lumps/masses/pain: no  Nausea and/or vomiting: no  Abdominal pain: no  History:   History of frequent UTI s: no  History of kidney stones: no  History of hernias: YES  Personal or Family history of Prostate problems: no  Sexually active: YES- no new partners, denies concern for STI  Precipitating or alleviating factors: [place]  Therapies tried and outcome: increase fluid intake      Review of Systems   Constitutional, HEENT, cardiovascular, pulmonary, gi and gu systems are negative, except as otherwise noted.      Objective    /60 (BP Location: Right arm, Patient Position: Sitting, Cuff Size: Adult Large)   Pulse 83   Temp 99.9  F (37.7  C) (Tympanic)   Resp 16   Ht 1.854 m (6' 1\")   Wt 105.7 kg (233 lb)   SpO2 97%   BMI 30.74 kg/m    Body mass index is 30.74 kg/m .  Physical Exam   GENERAL: healthy, alert and no distress  CV: regular rates and rhythm and normal S1 S2, no S3 or S4  ABDOMEN: soft, nontender    Results for orders placed or performed in visit on 08/24/20   *UA reflex to Microscopic and Culture ([facility] (except [place] and [place])     Status: Abnormal    Specimen: Midstream Urine   Result Value Ref Range    Color Urine Yellow     Appearance Urine Clear     Glucose Urine Negative NEG^Negative mg/dL    Bilirubin Urine Negative NEG^Negative    Ketones Urine Negative " "NEG^Negative mg/dL    Specific Gravity Urine <=1.005 1.003 - 1.035    Blood Urine Trace (A) NEG^Negative    pH Urine 6.5 5.0 - 7.0 pH    Protein Albumin Urine Negative NEG^Negative mg/dL    Urobilinogen Urine 0.2 0.2 - 1.0 EU/dL    Nitrite Urine Negative NEG^Negative    Leukocyte Esterase Urine Small (A) NEG^Negative    Source Midstream Urine    Urine Microscopic     Status: Abnormal   Result Value Ref Range    WBC Urine 10-25 (A) OTO5^0 - 5 /HPF    RBC Urine O - 2 OTO2^O - 2 /HPF    Bacteria Urine Few (A) NEG^Negative /HPF           Assessment & Plan     Dysuria    - *UA reflex to Microscopic and Culture ([facility] (except [place] and [place])  - Urine Microscopic  - Urine Culture Aerobic Bacterial    Nonspecific finding on examination of urine    - Urine Culture Aerobic Bacterial    Acute cystitis without hematuria    - sulfamethoxazole-trimethoprim (BACTRIM DS) 800-160 MG tablet; Take 1 tablet by mouth 2 times daily for 7 days     BMI:   Estimated body mass index is 30.74 kg/m  as calculated from the following:    Height as of this encounter: 1.854 m (6' 1\").    Weight as of this encounter: 105.7 kg (233 lb).   Weight management plan: Patient was referred to their PCP to discuss a diet and exercise plan.        FUTURE APPOINTMENTS:       - Follow up in 3 days for symptoms that are not improving, sooner for new or worsening sx. Urine culture pending.    Patient Instructions     Patient Education     Urinary Tract Infections in Men    Urinary tract infections (UTIs) are most often caused by bacteria that invade the urinary tract. The bacteria may come from outside the body. Or they may travel from the skin outside of the rectum into the urethra. Pain in or around the urinary tract is a common symptom for most UTIs. But the only way to know for sure if you have a UTI is to have a urinalysis and urine culture.   Types of UTIs    Cystitis. This is a bladder infection. It is often linked to a " blockage from an enlarged prostate. You may have an urgent or frequent need to urinate, and bloody urine. Treatment includes antibiotics and medicine to relax or shrink the prostate. Sometimes you will need surgery.    Urethritis. This is an infection of the urethra. You may have a discharge from the urethra or burning when you urinate. You may also have pain in the urethra or penis. It is treated with antibiotics.    Prostatitis. This is an inflammation or infection of the prostate. You may have an urgent or frequent need to urinate, fever, or burning when you urinate. Or you may have a tender prostate, or a vague feeling of pressure. Prostatitis is treated with a range of medicines, depending on the cause.    Pyelonephritis. This is a kidney infection. If not treated, it can be serious and damage your kidneys. In severe cases you may need to stay in the hospital. You may have a fever and lower back pain.  Medicines to treat a UTI  Most UTIs are treated with antibiotics. These kill the bacteria. The length of time you need to take them depends on the type of infection. Take antibiotics exactly as directed until all of the medicine is gone. If you don't, the infection may not go away and may become harder to treat. For certain types of UTIs, you may be given other medicine to help treat your symptoms.  Lifestyle changes to treat and prevent UTIs  The lifestyle changes below will help get rid of your current infection. They may also help prevent future UTIs.    Drink plenty of fluids such as water, juice, or other caffeine-free drinks. This helps flush bacteria out of your system.    Empty your bladder when you feel the urge to urinate and before going to sleep. Urine that stays in your bladder promotes infection.    Use condoms during sex. These help prevent UTIs caused by sexually transmitted bacteria.    Keep follow-up appointments with your healthcare provider. He or she can may do tests to make sure the  infection has cleared. If needed, more treatment can be started.  Other treatments to prevent UTIs  Most UTIs respond to medicine. But sometimes you will need a procedure or surgery. This can treat an enlarged prostate, or remove a kidney stone or other blockage. Surgery may also treat problems caused by scarring or long-term infections.  Date Last Reviewed: 1/1/2017 2000-2017 [email or web address]. [address] [place] [address]. All rights reserved. This information is not intended as a substitute for professional medical care. Always follow your healthcare professional's instructions. This information has been modified by your health care provider with permission from the publisher.               No follow-ups on file.    CHERIE Kelly [facility]     2019

## 2020-08-25 LAB
BACTERIA SPEC CULT: ABNORMAL
SPECIMEN SOURCE: ABNORMAL

## 2020-11-11 ENCOUNTER — OFFICE VISIT (OUTPATIENT)
Dept: FAMILY MEDICINE | Facility: CLINIC | Age: 35
End: 2020-11-11
Payer: COMMERCIAL

## 2020-11-11 VITALS
RESPIRATION RATE: 12 BRPM | DIASTOLIC BLOOD PRESSURE: 74 MMHG | HEART RATE: 71 BPM | OXYGEN SATURATION: 97 % | WEIGHT: 229.8 LBS | TEMPERATURE: 96.3 F | HEIGHT: 73 IN | SYSTOLIC BLOOD PRESSURE: 124 MMHG | BODY MASS INDEX: 30.46 KG/M2

## 2020-11-11 DIAGNOSIS — N30.01 ACUTE CYSTITIS WITH HEMATURIA: Primary | ICD-10-CM

## 2020-11-11 DIAGNOSIS — Z23 NEED FOR PROPHYLACTIC VACCINATION AND INOCULATION AGAINST INFLUENZA: ICD-10-CM

## 2020-11-11 DIAGNOSIS — R82.90 URINE FINDINGS ABNORMAL: ICD-10-CM

## 2020-11-11 DIAGNOSIS — R39.9 SYMPTOMS INVOLVING URINARY SYSTEM: ICD-10-CM

## 2020-11-11 LAB
ALBUMIN UR-MCNC: NEGATIVE MG/DL
APPEARANCE UR: CLEAR
BACTERIA #/AREA URNS HPF: ABNORMAL /HPF
BILIRUB UR QL STRIP: NEGATIVE
COLOR UR AUTO: YELLOW
GLUCOSE UR STRIP-MCNC: NEGATIVE MG/DL
HGB UR QL STRIP: ABNORMAL
KETONES UR STRIP-MCNC: NEGATIVE MG/DL
LEUKOCYTE ESTERASE UR QL STRIP: ABNORMAL
NITRATE UR QL: NEGATIVE
NON-SQ EPI CELLS #/AREA URNS LPF: ABNORMAL /LPF
PH UR STRIP: 7 PH (ref 5–7)
RBC #/AREA URNS AUTO: ABNORMAL /HPF
SOURCE: ABNORMAL
SP GR UR STRIP: 1.02 (ref 1–1.03)
UROBILINOGEN UR STRIP-ACNC: 0.2 EU/DL (ref 0.2–1)
WBC #/AREA URNS AUTO: ABNORMAL /HPF

## 2020-11-11 PROCEDURE — 87086 URINE CULTURE/COLONY COUNT: CPT | Performed by: NURSE PRACTITIONER

## 2020-11-11 PROCEDURE — 87088 URINE BACTERIA CULTURE: CPT | Performed by: NURSE PRACTITIONER

## 2020-11-11 PROCEDURE — 99213 OFFICE O/P EST LOW 20 MIN: CPT | Mod: 25 | Performed by: NURSE PRACTITIONER

## 2020-11-11 PROCEDURE — 87186 SC STD MICRODIL/AGAR DIL: CPT | Performed by: NURSE PRACTITIONER

## 2020-11-11 PROCEDURE — 81001 URINALYSIS AUTO W/SCOPE: CPT | Performed by: NURSE PRACTITIONER

## 2020-11-11 PROCEDURE — 90471 IMMUNIZATION ADMIN: CPT | Performed by: NURSE PRACTITIONER

## 2020-11-11 PROCEDURE — 90686 IIV4 VACC NO PRSV 0.5 ML IM: CPT | Performed by: NURSE PRACTITIONER

## 2020-11-11 RX ORDER — SULFAMETHOXAZOLE/TRIMETHOPRIM 800-160 MG
1 TABLET ORAL 2 TIMES DAILY
Qty: 20 TABLET | Refills: 0 | Status: SHIPPED | OUTPATIENT
Start: 2020-11-11 | End: 2020-11-21

## 2020-11-11 ASSESSMENT — ENCOUNTER SYMPTOMS
VOMITING: 0
FLANK PAIN: 0
ABDOMINAL PAIN: 0
DYSURIA: 1
NAUSEA: 0
FREQUENCY: 1
HEMATURIA: 1
FEVER: 0
DIFFICULTY URINATING: 0

## 2020-11-11 ASSESSMENT — MIFFLIN-ST. JEOR: SCORE: 2021.25

## 2020-11-11 NOTE — PROGRESS NOTES
"Subjective     Maged Hugo is a 35 year old male who presents to clinic today for the following health issues:    HPI         Genitourinary - Male  Onset/Duration: 5-6 days  Description:   Dysuria (painful urination): YES  Hematuria (blood in urine): YES  Frequency: YES  Waking at night to urinate: YES  Hesitancy (delay in urine): no  Retention (unable to empty): no  Decrease in urinary flow: no  Incontinence: no  Progression of Symptoms:  Seemed to get better and then bad again  Accompanying Signs & Symptoms:  Fever: no  Back/Flank pain: no  Urethral discharge: no  Testicle lumps/masses/pain: YES- a little pain, history of vasectomy in December  Nausea and/or vomiting: no  Abdominal pain: no  History:   History of frequent UTI s: had 1st one in July  History of kidney stones: no  History of hernias: YES  Personal or Family history of Prostate problems: no  Sexually active: YES  Precipitating or alleviating factors: None  Therapies tried and outcome: none    Additional provider notes: Was hunting in the woods over the week/weekend. Symptoms started last Friday. Hx of vasectomy; . No new sexual partners. No concerns for STI. Works in construction; is outdoors a lot. Admits he probably doesn't drink enough fluids.     Review of Systems   Constitutional: Negative for fever.   Gastrointestinal: Negative for abdominal pain, nausea and vomiting.   Genitourinary: Positive for dysuria, frequency and hematuria. Negative for decreased urine volume, difficulty urinating, discharge, flank pain, genital sores, penile pain, penile swelling, scrotal swelling and testicular pain (occasionally tender since vasectomy).            Objective    /74 (BP Location: Right arm, Patient Position: Chair, Cuff Size: Adult Large)   Pulse 71   Temp 96.3  F (35.7  C) (Tympanic)   Resp 12   Ht 1.854 m (6' 1\")   Wt 104.2 kg (229 lb 12.8 oz)   SpO2 97%   BMI 30.32 kg/m    Body mass index is 30.32 kg/m .  Physical " Exam  Vitals signs and nursing note reviewed.   Constitutional:       General: He is not in acute distress.     Appearance: Normal appearance. He is not ill-appearing or toxic-appearing.   Cardiovascular:      Rate and Rhythm: Normal rate and regular rhythm.      Pulses: Normal pulses.      Heart sounds: Normal heart sounds.   Pulmonary:      Effort: Pulmonary effort is normal.      Breath sounds: Normal breath sounds.   Abdominal:      General: Abdomen is flat. Bowel sounds are normal.      Palpations: Abdomen is soft.      Tenderness: There is no right CVA tenderness or left CVA tenderness.   Skin:     General: Skin is warm and dry.   Neurological:      Mental Status: He is alert and oriented to person, place, and time.   Psychiatric:         Behavior: Behavior normal.                    Assessment & Plan     Acute cystitis with hematuria  UA consistent with UTI. 2nd UTI in <6 months. Bactrim prescribed. Side effects, risks and benefits of medication were discussed with patient. Discussed how and when to take medication. Recommended taking a probiotic and/or eating yogurt every day while on antibiotics and for ~1 week after stopping antibiotics to prevent GI upset. Discussed OTC recommendations for symptom control. Rest, hydration, humidification. Recommended urology referral if he develops another UTI in next 6 months.     - sulfamethoxazole-trimethoprim (BACTRIM DS) 800-160 MG tablet; Take 1 tablet by mouth 2 times daily for 10 days    Symptoms involving urinary system    - *UA reflex to Microscopic and Culture ([facility] (except [place] and [place])  - Urine Microscopic    Need for prophylactic vaccination and inoculation against influenza    - INFLUENZA VACCINE IM > 6 MONTHS VALENT IIV4 [03373]    Urine findings abnormal  Urine culture ordered. Will notify patient with results.     - Urine Culture Aerobic Bacterial        Patient Instructions   You have a urinary tract infection.  1. Take  antibiotics as prescribed.  2. Take a probiotic and/or eat yogurt daily while on antibiotics and 10 days after to prevent GI upset.  3. Increase fluid intake to 6-8 glasses of water a day.  4. Follow-up if symptoms do not improve after antibiotics. If you develop another UTI, will recommend follow-up with urology.         Return if symptoms worsen or fail to improve.    CHERIE Baird CNP  [facility]

## 2020-11-11 NOTE — PATIENT INSTRUCTIONS
You have a urinary tract infection.  1. Take antibiotics as prescribed.  2. Take a probiotic and/or eat yogurt daily while on antibiotics and 10 days after to prevent GI upset.  3. Increase fluid intake to 6-8 glasses of water a day.  4. Follow-up if symptoms do not improve after antibiotics. If you develop another UTI, will recommend follow-up with urology.

## 2020-11-13 LAB
BACTERIA SPEC CULT: ABNORMAL
Lab: ABNORMAL
SPECIMEN SOURCE: ABNORMAL

## 2021-02-13 ENCOUNTER — APPOINTMENT (OUTPATIENT)
Dept: ULTRASOUND IMAGING | Facility: CLINIC | Age: 36
End: 2021-02-13
Attending: FAMILY MEDICINE
Payer: COMMERCIAL

## 2021-02-13 ENCOUNTER — HOSPITAL ENCOUNTER (EMERGENCY)
Facility: CLINIC | Age: 36
Discharge: HOME | End: 2021-02-13
Attending: FAMILY MEDICINE | Admitting: FAMILY MEDICINE
Payer: COMMERCIAL

## 2021-02-13 ENCOUNTER — NURSE TRIAGE (OUTPATIENT)
Dept: NURSING | Facility: CLINIC | Age: 36
End: 2021-02-13

## 2021-02-13 VITALS
TEMPERATURE: 99 F | DIASTOLIC BLOOD PRESSURE: 106 MMHG | OXYGEN SATURATION: 93 % | HEIGHT: 74 IN | WEIGHT: 215 LBS | RESPIRATION RATE: 18 BRPM | HEART RATE: 92 BPM | BODY MASS INDEX: 27.59 KG/M2 | SYSTOLIC BLOOD PRESSURE: 168 MMHG

## 2021-02-13 DIAGNOSIS — I80.02 THROMBOPHLEBITIS OF SUPERFICIAL VEINS OF LEFT LOWER EXTREMITY: ICD-10-CM

## 2021-02-13 PROCEDURE — 99284 EMERGENCY DEPT VISIT MOD MDM: CPT | Performed by: FAMILY MEDICINE

## 2021-02-13 PROCEDURE — 93971 EXTREMITY STUDY: CPT | Mod: LT

## 2021-02-13 PROCEDURE — 99284 EMERGENCY DEPT VISIT MOD MDM: CPT | Mod: 25 | Performed by: FAMILY MEDICINE

## 2021-02-13 ASSESSMENT — MIFFLIN-ST. JEOR: SCORE: 1969.98

## 2021-02-13 NOTE — ED PROVIDER NOTES
"  History     Chief Complaint   Patient presents with     Leg Pain     leg pain for 3 days. no injury. \"\"noreen horse\"     HPI  Maged Hugo is a 35 year old male who presents with left calf pain.  He has had this for 3 days.  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  His wife is a nurse who advised him to come in to be checked for a DVT.  He takes no medications.  He has no identified medical problems.  He has not noticed any leg swelling.  He does not feel short of breath or have palpitations or chest pain.  Pain is somewhat aggravated with movement and bother him during the night.    Allergies:  No Known Allergies    Problem List:    There are no active problems to display for this patient.       Past Medical History:    No past medical history on file.    Past Surgical History:    No past surgical history on file.    Family History:    Family History   Problem Relation Age of Onset     Cancer Maternal Grandfather         lung     Multiple Sclerosis Paternal Grandfather      Pacemaker Paternal Grandfather        Social History:  Marital Status:   [2]  Social History     Tobacco Use     Smoking status: Never Smoker     Smokeless tobacco: Never Used   Substance Use Topics     Alcohol use: Yes     Comment: social     Drug use: Never        Medications:         NO ACTIVE MEDICATIONS          Review of Systems  All other systems are reviewed and are negative    Physical Exam   BP: (!) 168/106  Pulse: 92  Temp: 99  F (37.2  C)  Resp: 18  Height: 188 cm (6' 2\")  Weight: 97.5 kg (215 lb)  SpO2: 93 %      Physical Exam    Nursing note and vitals were reviewed.  Constitutional: Awake and alert, adequately nourished and developed appearing 35-year-old in no apparent discomfort, who does not appear acutely ill, and who answers questions appropriately and cooperates with examination.  HEENT: EOMI.   Pulmonary/Chest: Breathing is unlabored.  Musculoskeletal: " Extremities are warm and well-perfused and without edema.  There is mild tenderness in the mid calf posteriorly without swelling, redness, warmth.  Neurological: Alert, oriented, thought content logical, coherent   Skin: Warm, dry, no rashes.  Psychiatric: Affect broad and appropriate.      ED Course        Procedures            Critical Care time:  none            Results for orders placed or performed during the hospital encounter of 02/13/21 (from the past 24 hour(s))   US Lower Extremity Venous Duplex Left    Narrative    ULTRASOUND LEFT LOWER EXTREMITY VENOUS DUPLEX February 13, 2021 1:16  PM     HISTORY: Left calf pain.    COMPARISON: None.    TECHNIQUE: Spectral doppler and waveform analysis were performed on  the left lower extremity veins.    FINDINGS: The left common femoral, femoral, and popliteal veins are  patent, compressible, and negative for deep venous thrombosis. Calf  veins are segmentally seen but appear negative for DVT where  visualized. There is nonocclusive superficial thrombophlebitis noted  within a small vein in the left mid calf posteriorly, possibly the  lesser saphenous vein. This finding appears to correspond to the  region of the patient's pain.      Impression    IMPRESSION:    1. No evidence for deep venous thrombosis in the left lower extremity  veins.   2. Nonocclusive superficial thrombophlebitis within a small vein in  the left mid calf posteriorly, possibly the lesser saphenous vein, and  corresponding to the patient's palpable abnormality.       Medications - No data to display    Assessments & Plan (with Medical Decision Making)     35-year-old male presented with 3 days of left calf pain with no history of trauma.  No edema was present.  No skin changes were noted.  The foot was warm and well-perfused.  He had no pulmonary symptoms.  Ultrasound of the left lower extremity showed a nonocclusive thrombus in a superficial vein of the calf corresponding to his area of  tenderness.  I would be suspicious that this was due to local trauma.  There is no risk for PE from this but there is a small risk of propagation to the deep venous system with subsequent risk for PE.  Therefore I have asked him to take 1 aspirin daily and to follow-up in 7 to 10 days in primary care and have another ultrasound to ensure no propagation and to look for evidence of resolution.  Return to the emergency department if increased swelling, crease pain, or other concerning symptoms prior to scheduled follow-up.  He expressed understanding and his questions were answered.    I have reviewed the nursing notes.    I have reviewed the findings, diagnosis, plan and need for follow up with the patient.       New Prescriptions    No medications on file       Final diagnoses:   Thrombophlebitis of superficial veins of left lower extremity       2/13/2021   [facility] EMERGENCY DEPT     Vincent Moore MD  02/13/21 [id number]

## 2021-02-13 NOTE — DISCHARGE INSTRUCTIONS
Take aspirin 325 mg once daily.  You have no activity restrictions.  Follow-up as scheduled for recheck primary care.  They will order a repeat ultrasound at your follow-up visit.  Return to the emergency department if you have swelling, increased pain, chest pain, rapid or irregular heartbeat, or other new concerning symptoms.

## 2021-02-13 NOTE — ED NOTES
Pt is  who noticed left calf pain x3 days - states worse after he rests the leg - feels like a calf 'noreen horse'. Patient denies any redness, swelling or shortness of breath. Patient is not a smoker, no other risk factors for dvt. Bilat pedal pulses are equal

## 2021-02-13 NOTE — TELEPHONE ENCOUNTER
Spouse calling; patient present.  States patient has had right calf pain since 2/10/21.  Pain is constant and worsening; rates pain 6-7 out of 10.  Denies redness, warmth, swelling.  Is starting to affect his walking ability.  Patient doesn't have a PCP.  Advised to be seen with 4 hours; will go to [place].    Additional Information    Negative: Looks like a broken bone or dislocated joint (e.g., crooked or deformed)    Negative: Sounds like a life-threatening emergency to the triager    Negative: Followed a leg injury    Negative: Leg swelling is main symptom    Negative: Back pain radiating (shooting) into leg(s)    Negative: Knee pain is main symptom    Negative: Ankle pain is main symptom    Negative: Pregnant    Negative: Postpartum (from 0 to 6 weeks after delivery)    Negative: Chest pain    Negative: Difficulty breathing    Negative: Entire foot is cool or blue in comparison to other side    Negative: Unable to walk    Negative: [1] Red area or streak AND [2] fever    Negative: [1] Swollen joint AND [2] fever    Negative: [1] Cast on leg or ankle AND [2] now increased pain    Negative: Patient sounds very sick or weak to the triager    Negative: [1] SEVERE pain (e.g., excruciating, unable to do any normal activities) AND [2] not improved after 2 hours of pain medicine    [1] Thigh or calf pain AND [2] only 1 side AND [3] present > 1 hour    Protocols used: LEG PAIN-A-

## 2021-02-23 ENCOUNTER — OFFICE VISIT (OUTPATIENT)
Dept: FAMILY MEDICINE | Facility: CLINIC | Age: 36
End: 2021-02-23
Payer: COMMERCIAL

## 2021-02-23 VITALS
TEMPERATURE: 96.3 F | HEART RATE: 73 BPM | WEIGHT: 245 LBS | OXYGEN SATURATION: 95 % | SYSTOLIC BLOOD PRESSURE: 124 MMHG | HEIGHT: 74 IN | RESPIRATION RATE: 14 BRPM | DIASTOLIC BLOOD PRESSURE: 86 MMHG | BODY MASS INDEX: 31.44 KG/M2

## 2021-02-23 DIAGNOSIS — I80.02 THROMBOPHLEBITIS OF LEG, LEFT, SUPERFICIAL: Primary | ICD-10-CM

## 2021-02-23 PROCEDURE — 99213 OFFICE O/P EST LOW 20 MIN: CPT | Performed by: FAMILY MEDICINE

## 2021-02-23 ASSESSMENT — MIFFLIN-ST. JEOR: SCORE: 2098.12

## 2021-02-23 NOTE — PROGRESS NOTES
Assessment & Plan     Thrombophlebitis of leg, left, superficial  At least his symptoms have resolved, and he has no residual signs on exam.  Will keep on Aspirin 81 mg daily for a few more weeks.  Activity as tolerated. Keep active.  Return precautions discussed and given to patient.     Patient Instructions   Symptoms and signs have resolved.   No additional testing needed at this time.  Continue baby aspirin 81 mg orally daily for 3-4 more weeks. Then may stop.  If you have recurrence of thrombophlebitis or if you develop deep venin clots, a clotting work up will be needed in addition to treatment.    Keep active.  Patient Education     Superficial Thrombophlebitis   The superficial veins are the veins near the surface of the skin. Superficial thrombophlebitis is a problem that occurs when one or more of these veins become red, irritated, and swollen. This is most often because of a blood clot.   Causes  The problem may occur after injury to a vein. It may also occur after having an intravenous (IV) line placed. Other factors that can make the problem more likely include:     Varicose veins    Venous insufficiency    Bleeding disorders    Prolonged periods of rest and not moving around    IV drug abuse    Pregnancy    Use of birth control pills or estrogen therapy  Symptoms  Symptoms may appear in the affected area. They can include:    Pain    Tenderness    Redness    Warmth    Swelling    Hardening of the vein  In most cases, superficial thrombophlebitis resolves on its own with no problems. Treatment is focused on relieving symptoms.   Sometimes, there is a risk that the deep veins in the body may also be involved. This can lead to more serious problems. In such cases, further testing and treatments may be needed. Your healthcare provider can tell you more about this.   Home care  To help relieve pain and swelling, you may be told to:    Apply heat or cold to the affected area. Do this for up to 10 minutes  as often as directed.  ? Heat: Use a warm compress, such as a heating pad.  ? Cold: Use a cold compress, such as a cold pack or bag of ice wrapped in a thin towel.    Take nonsteroidal anti-inflammatory drugs (NSAIDS), such as ibuprofen. In some cases, other pain medicines may be prescribed.    Keep the affected limb (arm or leg) raised above heart level as directed.    Wear elastic compression stockings or bandages as directed.    Don't sit or stand for long periods. Get up and walk often.  To help treat a blood clot, a blood thinner (anticoagulant) may be prescribed. If this is needed, be sure to take the medicine exactly as directed.   Follow-up care  Follow up with your healthcare provider as advised. If imaging tests are done, they will be reviewed by a doctor. You ll be told the results and any new findings that may affect your treatment.   When to seek medical advice  Call your healthcare provider right away if any of these occur:    Fever of 100.4 F (38 C) or higher, or as directed by your healthcare provider    Increasing pain, swelling, or tenderness in the affected area    Spreading warmth or redness in the affected area  Call 911  Call 911 if any of these occur:     Trouble breathing    Chest pain or discomfort that worsens with deep breathing or coughing    Coughing (may cough up blood)    Fast or irregular heartbeat    Sweating    Anxiety    Lightheadedness, dizziness, or fainting    Extreme confusion    Extreme drowsiness or trouble waking up    New pain in the chest, arm, shoulder, neck, or upper back  [employer] last reviewed this educational content on 4/1/2018 2000-2020 [employer]. [address] [place] [address], [place] [address]. All rights reserved. This information is not intended as a substitute for professional medical care. Always follow your healthcare professional's instructions.               Return if symptoms recur.    Gerhard Mobley MD  [facility]  "NAOMIE Lynne is a 35 year old who presents for the following health issues    Chief Complaint   Patient presents with     ER F/U     Pt here for post e/r f/u.       [facility]       ED/UC Followup:    Facility:  [facility]  Date of visit: 2/13/21  Reason for visit: lower left leg pain, thrombophlebitis  Current Status: pt doing good, back to normal   Patient denies any more pain, skin redness or leg swelling.  No other questions.  Been taking baby aspirin daily.     There is no problem list on file for this patient.    History reviewed. No pertinent surgical history.    Social History     Tobacco Use     Smoking status: Never Smoker     Smokeless tobacco: Never Used   Substance Use Topics     Alcohol use: Yes     Comment: social     Family History   Problem Relation Age of Onset     Cancer Maternal Grandfather         lung     Multiple Sclerosis Paternal Grandfather      Pacemaker Paternal Grandfather          Current Outpatient Medications   Medication Sig Dispense Refill     NO ACTIVE MEDICATIONS        No Known Allergies      Review of Systems   Constitutional, HEENT, cardiovascular, pulmonary, GI, , musculoskeletal, neuro, skin, endocrine and psych systems are negative, except as otherwise noted.      Objective    /86   Pulse 73   Temp 96.3  F (35.7  C) (Tympanic)   Resp 14   Ht 1.867 m (6' 1.5\")   Wt 111.1 kg (245 lb)   SpO2 95%   BMI 31.89 kg/m    Body mass index is 31.89 kg/m .  Physical Exam   GEN: alert, oriented x 3, NAD  SKIN: no erythema or ulcer  LLE: no gross deformity/swelling, no erythema of skin, no ulcer, no tenderness from thigh to lower leg/calf or foot, no Avril's sign, good pedal pulse    No results found for any visits on 02/23/21.          "

## 2021-02-23 NOTE — PATIENT INSTRUCTIONS
Symptoms and signs have resolved.   No additional testing needed at this time.  Continue baby aspirin 81 mg orally daily for 3-4 more weeks. Then may stop.  If you have recurrence of thrombophlebitis or if you develop deep venin clots, a clotting work up will be needed in addition to treatment.    Keep active.  Patient Education     Superficial Thrombophlebitis   The superficial veins are the veins near the surface of the skin. Superficial thrombophlebitis is a problem that occurs when one or more of these veins become red, irritated, and swollen. This is most often because of a blood clot.   Causes  The problem may occur after injury to a vein. It may also occur after having an intravenous (IV) line placed. Other factors that can make the problem more likely include:     Varicose veins    Venous insufficiency    Bleeding disorders    Prolonged periods of rest and not moving around    IV drug abuse    Pregnancy    Use of birth control pills or estrogen therapy  Symptoms  Symptoms may appear in the affected area. They can include:    Pain    Tenderness    Redness    Warmth    Swelling    Hardening of the vein  In most cases, superficial thrombophlebitis resolves on its own with no problems. Treatment is focused on relieving symptoms.   Sometimes, there is a risk that the deep veins in the body may also be involved. This can lead to more serious problems. In such cases, further testing and treatments may be needed. Your healthcare provider can tell you more about this.   Home care  To help relieve pain and swelling, you may be told to:    Apply heat or cold to the affected area. Do this for up to 10 minutes as often as directed.  ? Heat: Use a warm compress, such as a heating pad.  ? Cold: Use a cold compress, such as a cold pack or bag of ice wrapped in a thin towel.    Take nonsteroidal anti-inflammatory drugs (NSAIDS), such as ibuprofen. In some cases, other pain medicines may be prescribed.    Keep the affected  limb (arm or leg) raised above heart level as directed.    Wear elastic compression stockings or bandages as directed.    Don't sit or stand for long periods. Get up and walk often.  To help treat a blood clot, a blood thinner (anticoagulant) may be prescribed. If this is needed, be sure to take the medicine exactly as directed.   Follow-up care  Follow up with your healthcare provider as advised. If imaging tests are done, they will be reviewed by a doctor. You ll be told the results and any new findings that may affect your treatment.   When to seek medical advice  Call your healthcare provider right away if any of these occur:    Fever of 100.4 F (38 C) or higher, or as directed by your healthcare provider    Increasing pain, swelling, or tenderness in the affected area    Spreading warmth or redness in the affected area  Call 911  Call 911 if any of these occur:     Trouble breathing    Chest pain or discomfort that worsens with deep breathing or coughing    Coughing (may cough up blood)    Fast or irregular heartbeat    Sweating    Anxiety    Lightheadedness, dizziness, or fainting    Extreme confusion    Extreme drowsiness or trouble waking up    New pain in the chest, arm, shoulder, neck, or upper back  [employer] last reviewed this educational content on 4/1/2018 2000-2020 [employer]. [address] [place] [address], [place] [address]. All rights reserved. This information is not intended as a substitute for professional medical care. Always follow your healthcare professional's instructions.

## 2023-01-13 ENCOUNTER — E-VISIT (OUTPATIENT)
Dept: URGENT CARE | Facility: CLINIC | Age: 38
End: 2023-01-13
Payer: COMMERCIAL

## 2023-01-13 DIAGNOSIS — J01.90 ACUTE SINUSITIS WITH SYMPTOMS > 10 DAYS: Primary | ICD-10-CM

## 2023-01-13 PROCEDURE — 99421 OL DIG E/M SVC 5-10 MIN: CPT | Performed by: PHYSICIAN ASSISTANT

## 2023-01-13 NOTE — PATIENT INSTRUCTIONS
Dear Maged Hugo    After reviewing your responses, I've been able to diagnose you with Acute sinusitis with symptoms > 10 days.      Based on your responses and diagnosis, I have prescribed   Orders Placed This Encounter     amoxicillin-clavulanate (AUGMENTIN) 875-125 MG tablet    to treat your symptoms. I have sent this to your pharmacy.?     It is also important to stay well hydrated, get lots of rest and take over-the-counter decongestants,?tylenol?or ibuprofen if you?are able to?take those medications per your primary care provider to help relieve discomfort.?     It is important that you take?all of?your prescribed medication even if your symptoms are improving after a few doses.? Taking?all of?your medicine helps prevent the symptoms from returning.?     If your symptoms worsen, you develop severe headache, vomiting, high fever (>102), or are not improving in 7 days, please contact your primary care provider for an appointment or visit any of our convenient Walk-in Care or Urgent Care Centers to be seen which can be found on our website?here.?     Thanks again for choosing?us?as your health care partner,?   ?  Nasima Ortega PA-C?

## 2023-04-09 ENCOUNTER — HEALTH MAINTENANCE LETTER (OUTPATIENT)
Age: 38
End: 2023-04-09

## 2024-06-16 ENCOUNTER — HEALTH MAINTENANCE LETTER (OUTPATIENT)
Age: 39
End: 2024-06-16

## 2025-06-21 ENCOUNTER — HEALTH MAINTENANCE LETTER (OUTPATIENT)
Age: 40
End: 2025-06-21

## 2025-08-11 ENCOUNTER — HOSPITAL ENCOUNTER (EMERGENCY)
Facility: CLINIC | Age: 40
Discharge: HOME | End: 2025-08-11
Attending: NURSE PRACTITIONER | Admitting: NURSE PRACTITIONER
Payer: COMMERCIAL

## 2025-08-11 ENCOUNTER — APPOINTMENT (OUTPATIENT)
Dept: GENERAL RADIOLOGY | Facility: CLINIC | Age: 40
End: 2025-08-11
Attending: NURSE PRACTITIONER
Payer: COMMERCIAL

## 2025-08-11 VITALS
TEMPERATURE: 98.3 F | HEART RATE: 86 BPM | DIASTOLIC BLOOD PRESSURE: 81 MMHG | SYSTOLIC BLOOD PRESSURE: 137 MMHG | RESPIRATION RATE: 18 BRPM | OXYGEN SATURATION: 100 %

## 2025-08-11 DIAGNOSIS — S61.212A LACERATION OF RIGHT MIDDLE FINGER WITHOUT FOREIGN BODY WITHOUT DAMAGE TO NAIL, INITIAL ENCOUNTER: Primary | ICD-10-CM

## 2025-08-11 PROCEDURE — G0463 HOSPITAL OUTPT CLINIC VISIT: HCPCS | Mod: 25 | Performed by: NURSE PRACTITIONER

## 2025-08-11 PROCEDURE — 90471 IMMUNIZATION ADMIN: CPT | Performed by: NURSE PRACTITIONER

## 2025-08-11 PROCEDURE — 73140 X-RAY EXAM OF FINGER(S): CPT | Mod: RT

## 2025-08-11 PROCEDURE — 90715 TDAP VACCINE 7 YRS/> IM: CPT | Performed by: NURSE PRACTITIONER

## 2025-08-11 PROCEDURE — 250N000011 HC RX IP 250 OP 636: Performed by: NURSE PRACTITIONER

## 2025-08-11 PROCEDURE — 12001 RPR S/N/AX/GEN/TRNK 2.5CM/<: CPT | Performed by: NURSE PRACTITIONER

## 2025-08-11 PROCEDURE — 12001 RPR S/N/AX/GEN/TRNK 2.5CM/<: CPT | Mod: F7 | Performed by: NURSE PRACTITIONER

## 2025-08-11 RX ADMIN — CLOSTRIDIUM TETANI TOXOID ANTIGEN (FORMALDEHYDE INACTIVATED), CORYNEBACTERIUM DIPHTHERIAE TOXOID ANTIGEN (FORMALDEHYDE INACTIVATED), BORDETELLA PERTUSSIS TOXOID ANTIGEN (GLUTARALDEHYDE INACTIVATED), BORDETELLA PERTUSSIS FILAMENTOUS HEMAGGLUTININ ANTIGEN (FORMALDEHYDE INACTIVATED), BORDETELLA PERTUSSIS PERTACTIN ANTIGEN, AND BORDETELLA PERTUSSIS FIMBRIAE 2/3 ANTIGEN 0.5 ML: 5; 2; 2.5; 5; 3; 5 INJECTION, SUSPENSION INTRAMUSCULAR at 18:57

## 2025-08-11 ASSESSMENT — ACTIVITIES OF DAILY LIVING (ADL)
ADLS_ACUITY_SCORE: 41

## 2025-08-18 ENCOUNTER — TELEPHONE (OUTPATIENT)
Dept: ORTHOPEDICS | Facility: CLINIC | Age: 40
End: 2025-08-18
Payer: COMMERCIAL

## 2025-08-21 ENCOUNTER — DOCUMENTATION ONLY (OUTPATIENT)
Dept: OTHER | Facility: CLINIC | Age: 40
End: 2025-08-21
Payer: COMMERCIAL

## 2025-08-21 DIAGNOSIS — S61.401A FLEXOR TENDON LACERATION OF HAND WITH OPEN WOUND, RIGHT, INITIAL ENCOUNTER: Primary | ICD-10-CM

## 2025-08-21 DIAGNOSIS — S66.821A FLEXOR TENDON LACERATION OF HAND WITH OPEN WOUND, RIGHT, INITIAL ENCOUNTER: Primary | ICD-10-CM

## 2025-08-21 RX ORDER — OXYCODONE HYDROCHLORIDE 5 MG/1
5 TABLET ORAL EVERY 6 HOURS PRN
Qty: 5 TABLET | Refills: 0 | Status: SHIPPED | OUTPATIENT
Start: 2025-08-21

## 2025-08-22 PROBLEM — Z47.89 ORTHOPEDIC AFTERCARE: Status: ACTIVE | Noted: 2025-08-22

## 2025-08-22 PROBLEM — S66.821D: Status: ACTIVE | Noted: 2025-08-22

## 2025-08-22 PROBLEM — S61.401D: Status: ACTIVE | Noted: 2025-08-22

## 2025-08-26 ENCOUNTER — THERAPY VISIT (OUTPATIENT)
Dept: OCCUPATIONAL THERAPY | Facility: CLINIC | Age: 40
End: 2025-08-26
Attending: STUDENT IN AN ORGANIZED HEALTH CARE EDUCATION/TRAINING PROGRAM
Payer: COMMERCIAL

## 2025-08-26 DIAGNOSIS — S66.821D FLEXOR TENDON LACERATION OF HAND WITH OPEN WOUND, RIGHT, SUBSEQUENT ENCOUNTER: Primary | ICD-10-CM

## 2025-08-26 DIAGNOSIS — S61.401D FLEXOR TENDON LACERATION OF HAND WITH OPEN WOUND, RIGHT, SUBSEQUENT ENCOUNTER: Primary | ICD-10-CM

## 2025-08-26 DIAGNOSIS — Z47.89 ORTHOPEDIC AFTERCARE: ICD-10-CM

## 2025-08-26 PROCEDURE — 97140 MANUAL THERAPY 1/> REGIONS: CPT | Mod: GO | Performed by: OCCUPATIONAL THERAPIST

## 2025-08-26 PROCEDURE — 97530 THERAPEUTIC ACTIVITIES: CPT | Mod: GO | Performed by: OCCUPATIONAL THERAPIST

## 2025-08-26 PROCEDURE — 97110 THERAPEUTIC EXERCISES: CPT | Mod: GO | Performed by: OCCUPATIONAL THERAPIST

## 2025-09-03 ENCOUNTER — THERAPY VISIT (OUTPATIENT)
Dept: OCCUPATIONAL THERAPY | Facility: CLINIC | Age: 40
End: 2025-09-03
Attending: STUDENT IN AN ORGANIZED HEALTH CARE EDUCATION/TRAINING PROGRAM
Payer: COMMERCIAL

## 2025-09-03 DIAGNOSIS — S61.401D FLEXOR TENDON LACERATION OF HAND WITH OPEN WOUND, RIGHT, SUBSEQUENT ENCOUNTER: Primary | ICD-10-CM

## 2025-09-03 DIAGNOSIS — S66.821D FLEXOR TENDON LACERATION OF HAND WITH OPEN WOUND, RIGHT, SUBSEQUENT ENCOUNTER: Primary | ICD-10-CM

## 2025-09-03 DIAGNOSIS — Z47.89 ORTHOPEDIC AFTERCARE: ICD-10-CM

## 2025-09-03 PROCEDURE — 97110 THERAPEUTIC EXERCISES: CPT | Mod: GO | Performed by: OCCUPATIONAL THERAPIST

## 2025-09-03 PROCEDURE — 97140 MANUAL THERAPY 1/> REGIONS: CPT | Mod: GO | Performed by: OCCUPATIONAL THERAPIST
